# Patient Record
Sex: FEMALE
[De-identification: names, ages, dates, MRNs, and addresses within clinical notes are randomized per-mention and may not be internally consistent; named-entity substitution may affect disease eponyms.]

---
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
## 2019-01-23 ENCOUNTER — HOSPITAL ENCOUNTER (EMERGENCY)
Dept: HOSPITAL 31 - C.ER | Age: 19
Discharge: HOME | End: 2019-01-23
Payer: MEDICAID

## 2019-01-23 VITALS
HEART RATE: 78 BPM | TEMPERATURE: 98.1 F | DIASTOLIC BLOOD PRESSURE: 78 MMHG | SYSTOLIC BLOOD PRESSURE: 118 MMHG | RESPIRATION RATE: 14 BRPM

## 2019-01-23 VITALS — OXYGEN SATURATION: 97 %

## 2019-01-23 DIAGNOSIS — R10.32: ICD-10-CM

## 2019-01-23 DIAGNOSIS — K52.9: Primary | ICD-10-CM

## 2019-01-23 LAB
ALBUMIN SERPL-MCNC: 4.3 G/DL (ref 3.5–5)
ALBUMIN/GLOB SERPL: 1.6 {RATIO} (ref 1–2.1)
ALT SERPL-CCNC: 12 U/L (ref 9–52)
APTT BLD: 31 SECONDS (ref 21–34)
AST SERPL-CCNC: 17 U/L (ref 14–36)
BACTERIA #/AREA URNS HPF: (no result) /[HPF]
BASOPHILS # BLD AUTO: 0 K/UL (ref 0–0.2)
BASOPHILS NFR BLD: 0.4 % (ref 0–2)
BILIRUB UR-MCNC: NEGATIVE MG/DL
BUN SERPL-MCNC: 7 MG/DL (ref 7–17)
CALCIUM SERPL-MCNC: 9 MG/DL (ref 8.6–10.4)
CAOX CRY #/AREA URNS HPF: (no result) /HPF
EOSINOPHIL # BLD AUTO: 0.3 K/UL (ref 0–0.7)
EOSINOPHIL NFR BLD: 4.3 % (ref 0–4)
ERYTHROCYTE [DISTWIDTH] IN BLOOD BY AUTOMATED COUNT: 13.2 % (ref 11.5–14.5)
GFR NON-AFRICAN AMERICAN: > 60
GLUCOSE UR STRIP-MCNC: NORMAL MG/DL
HCG,QUALITATIVE URINE: NEGATIVE
HGB BLD-MCNC: 11.2 G/DL (ref 11–16)
INR PPP: 1.1
LEUKOCYTE ESTERASE UR-ACNC: (no result) LEU/UL
LIPASE: 183 U/L (ref 23–300)
LYMPHOCYTES # BLD AUTO: 2.5 K/UL (ref 1–4.3)
LYMPHOCYTES NFR BLD AUTO: 34.2 % (ref 20–40)
MCH RBC QN AUTO: 30 PG (ref 27–31)
MCHC RBC AUTO-ENTMCNC: 33.1 G/DL (ref 33–37)
MCV RBC AUTO: 90.7 FL (ref 81–99)
MONOCYTES # BLD: 0.5 K/UL (ref 0–0.8)
MONOCYTES NFR BLD: 7.1 % (ref 0–10)
NEUTROPHILS # BLD: 4 K/UL (ref 1.8–7)
NEUTROPHILS NFR BLD AUTO: 54 % (ref 50–75)
NRBC BLD AUTO-RTO: 0 % (ref 0–2)
PH UR STRIP: 7 [PH] (ref 5–8)
PLATELET # BLD: 211 K/UL (ref 130–400)
PMV BLD AUTO: 9.2 FL (ref 7.2–11.7)
PROT UR STRIP-MCNC: NEGATIVE MG/DL
PROTHROMBIN TIME: 11.5 SECONDS (ref 9.7–12.2)
RBC # BLD AUTO: 3.74 MIL/UL (ref 3.8–5.2)
RBC # UR STRIP: (no result) /UL
SP GR UR STRIP: 1.01 (ref 1–1.03)
SQUAMOUS EPITHIAL: 8 /HPF (ref 0–5)
UROBILINOGEN UR-MCNC: NORMAL MG/DL (ref 0.2–1)
WBC # BLD AUTO: 7.4 K/UL (ref 4.8–10.8)

## 2019-01-23 PROCEDURE — 85610 PROTHROMBIN TIME: CPT

## 2019-01-23 PROCEDURE — 85730 THROMBOPLASTIN TIME PARTIAL: CPT

## 2019-01-23 PROCEDURE — 96361 HYDRATE IV INFUSION ADD-ON: CPT

## 2019-01-23 PROCEDURE — 85025 COMPLETE CBC W/AUTO DIFF WBC: CPT

## 2019-01-23 PROCEDURE — 83690 ASSAY OF LIPASE: CPT

## 2019-01-23 PROCEDURE — 74177 CT ABD & PELVIS W/CONTRAST: CPT

## 2019-01-23 PROCEDURE — 84703 CHORIONIC GONADOTROPIN ASSAY: CPT

## 2019-01-23 PROCEDURE — 96374 THER/PROPH/DIAG INJ IV PUSH: CPT

## 2019-01-23 PROCEDURE — 96375 TX/PRO/DX INJ NEW DRUG ADDON: CPT

## 2019-01-23 PROCEDURE — 99283 EMERGENCY DEPT VISIT LOW MDM: CPT

## 2019-01-23 PROCEDURE — 81001 URINALYSIS AUTO W/SCOPE: CPT

## 2019-01-23 PROCEDURE — 80053 COMPREHEN METABOLIC PANEL: CPT

## 2019-01-23 NOTE — C.PDOC
History Of Present Illness





Patient presents with LLQ abdominal pain, some nausea and diarrhea since 

yesterday. Her lmp was last week, but it is irregular. Had 2-4 episodes of loose

stools. Tolerating po. 


Time Seen by Provider: 01/23/19 20:06


Chief Complaint (Nursing): Abdominal Pain


History Per: Patient


History/Exam Limitations: no limitations


Onset/Duration Of Symptoms: Days


Current Symptoms Are (Timing): Still Present


Context: Other


Severity: Moderate


Pain Scale Rating Of: 5


Location Of Pain/Discomfort: LLQ


Radiation Of Pain To:: None


Quality Of Discomfort: Sharp


Associated Symptoms: Nausea, Vomiting (x1), Diarrhea.  denies: Fever, Chills


Exacerbating Factors: Movement


Alleviating Factors: None


Last Bowel Movement: Today


Recent travel outside of the [place]: No


Additional History Per: Patient


Abnormal Vaginal Bleeding: No





Past Medical History


Reviewed: Historical Data, Nursing Documentation, Vital Signs


Vital Signs: 





                                Last Vital Signs











Temp  98 F   01/23/19 19:09


 


Pulse  85   01/23/19 19:09


 


Resp  18   01/23/19 19:09


 


BP  116/75   01/23/19 19:09


 


Pulse Ox  97   01/23/19 19:09














- Medical History


PMH: Anemia


Family History: States: Unknown Family Hx





- Social History


Hx Tobacco Use: No


Hx Alcohol Use: Yes


Hx Substance Use: No





- Immunization History


Hx Tetanus Toxoid Vaccination: No


Hx Influenza Vaccination: No


Hx Pneumococcal Vaccination: No





Review Of Systems


Constitutional: Negative for: Fever, Chills


Cardiovascular: Negative for: Chest Pain


Respiratory: Negative for: Shortness of Breath


Gastrointestinal: Positive for: Nausea, Vomiting (x1), Abdominal Pain (llq), 

Diarrhea


Genitourinary: Negative for: Dysuria


Musculoskeletal: Negative for: Back Pain


Skin: Negative for: Rash


Neurological: Negative for: Weakness


Psych: Negative for: Anxiety





Physical Exam





- Physical Exam


Appears: Non-toxic, No Acute Distress


Skin: Warm, Dry


Oral Mucosa: Moist


Neck: Supple


Chest: Symmetrical


Cardiovascular: Rhythm Regular


Respiratory: No Rales, No Rhonchi, No Wheezing


Gastrointestinal/Abdominal: Bowel Sounds (tympanic to percussion), Soft, T

enderness (llq), No Distention, No Guarding, No Rebound


Back: Normal Inspection


Extremity: Normal ROM


Extremity: Bilateral: Atraumatic


Neurological/Psych: Oriented x3


Gait: Steady





ED Course And Treatment





- Laboratory Results


Result Diagrams: 


                                 01/23/19 20:23





                                 01/23/19 20:23


O2 Sat by Pulse Oximetry: 97


Pulse Ox Interpretation: Normal


Reevaluation Time: 23:20


Reassessment Condition: Improved





Medical Decision Making


Medical Decision Making: 





Upon provider reevaluation patient is feeling better, is medically stable, and 

requires no further treatment in the ED at this time. Patient will be discharged

home with Rx for zofran . Counseling was provided and all questions were 

answered regarding diagnosis and need for follow up with dr howard. 

There is agreement to discharge plan. Return if symptoms persist or worsen.





Disposition


Counseled Patient/Family Regarding: Studies Performed, Diagnosis, Need For 

Followup, Rx Given





- Disposition


Referrals: 


Karoline Howard MD [Medical Doctor] - 


Disposition: HOME/ ROUTINE


Disposition Time: 20:06


Condition: FAIR


Additional Instructions: 


Please return if symptoms recur


Prescriptions: 


Ondansetron ODT [Zofran ODT] 1 odt PO BID PRN #6 odt


 PRN Reason: Nausea/Vomiting


Instructions:  Acute Abdomen (Belly Pain), Adult (DC)


Forms:  [facility] (English)





- Clinical Impression


Clinical Impression: 


 Enteritis, Abdominal pain

## 2019-01-24 NOTE — CT
Date of service: 



01/23/2019



PROCEDURE:  CT Abdomen and Pelvis with contrast



HISTORY:

Left lower quadrant, abdominal pain. Negative pregnancy test 

(concurrent with this examination).



COMPARISON:

07/05/2018 ultrasound pelvis



TECHNIQUE:

Intravenous contrast dose: 100 cc Visipaque 320.



Radiation dose:



Total exam DLP = 299.29 mGy-cm.



This CT exam was performed using one or more of the following dose 

reduction techniques: Automated exposure control, adjustment of the 

mA and/or kV according to patient size, and/or use of iterative 

reconstruction technique.



FINDINGS:



LOWER THORAX:

Unremarkable. 



LIVER:

Unremarkable. No gross lesion or ductal dilatation. 



GALLBLADDER AND BILE DUCTS:

Unremarkable. 



PANCREAS:

Unremarkable. No gross lesion or ductal dilatation.



SPLEEN:

Unremarkable. 



ADRENALS:

Unremarkable. No mass. 



KIDNEYS AND URETERS:

Unremarkable. No hydronephrosis. No solid mass. 



VASCULATURE:

Unremarkable. No aortic aneurysm. No atherosclerotic calcification or 

mural plaque present.



BOWEL:

Constipation without fecal impaction or obstruction. 



Fluid-filled loops of nondilated small bowel without evidence of 

mechanical obstruction.  



APPENDIX:

No abnormalities to suggest acute appendicitis. No right lower 

quadrant inflammatory processes identified. Incidental finding(s): 

Tiny appendicular lith. 



PERITONEUM:

Trace free fluid identified in the pelvis/cul de sac. No free air. 



LYMPH NODES:

Unremarkable. No enlarged lymph nodes. 



BLADDER:

Unremarkable. 



REPRODUCTIVE:

Unremarkable. 



BONES:

No acute fracture. 



OTHER FINDINGS:

None.



IMPRESSION:

No acute findings related to/ accounting  for the clinical 

presentation.



Additional benign and/or incidental findings described above.



________________________________________________



Concordant results (preliminary interpretation) provided by [facility].



Procedure Completed: 21:57.



Preliminary Report: Dictated and Authenticated: 23:10.



Final Interpretation: 12:25. January 24, 2019

## 2019-02-04 ENCOUNTER — HOSPITAL ENCOUNTER (EMERGENCY)
Dept: HOSPITAL 31 - C.ER | Age: 19
Discharge: HOME | End: 2019-02-04
Payer: MEDICAID

## 2019-02-04 VITALS
OXYGEN SATURATION: 100 % | DIASTOLIC BLOOD PRESSURE: 62 MMHG | TEMPERATURE: 98.2 F | SYSTOLIC BLOOD PRESSURE: 94 MMHG | HEART RATE: 74 BPM | RESPIRATION RATE: 16 BRPM
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
## 2019-02-04 DIAGNOSIS — T24.132A: ICD-10-CM
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
## 2019-02-04 DIAGNOSIS — T24.111A: ICD-10-CM

## 2019-02-04 DIAGNOSIS — T24.112A: Primary | ICD-10-CM

## 2019-02-04 DIAGNOSIS — T24.131A: ICD-10-CM

## 2019-02-04 DIAGNOSIS — T31.0: ICD-10-CM
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
